# Patient Record
Sex: MALE | Race: OTHER | HISPANIC OR LATINO | Employment: UNEMPLOYED | URBAN - METROPOLITAN AREA
[De-identification: names, ages, dates, MRNs, and addresses within clinical notes are randomized per-mention and may not be internally consistent; named-entity substitution may affect disease eponyms.]

---

## 2022-05-30 ENCOUNTER — HOSPITAL ENCOUNTER (EMERGENCY)
Facility: HOSPITAL | Age: 32
Discharge: HOME | End: 2022-05-30
Attending: EMERGENCY MEDICINE
Payer: MEDICAID

## 2022-05-30 ENCOUNTER — OFFICE VISIT (OUTPATIENT)
Dept: OPHTHALMOLOGY | Facility: CLINIC | Age: 32
End: 2022-05-30
Payer: MEDICAID

## 2022-05-30 VITALS
HEIGHT: 67 IN | WEIGHT: 164.25 LBS | TEMPERATURE: 98 F | DIASTOLIC BLOOD PRESSURE: 83 MMHG | RESPIRATION RATE: 16 BRPM | HEART RATE: 68 BPM | OXYGEN SATURATION: 98 % | SYSTOLIC BLOOD PRESSURE: 136 MMHG | BODY MASS INDEX: 25.78 KG/M2

## 2022-05-30 DIAGNOSIS — H20.9 TRAUMATIC IRITIS: Primary | ICD-10-CM

## 2022-05-30 DIAGNOSIS — H57.04 TRAUMATIC MYDRIASIS: ICD-10-CM

## 2022-05-30 PROCEDURE — 1159F MED LIST DOCD IN RCRD: CPT | Mod: CPTII,,, | Performed by: STUDENT IN AN ORGANIZED HEALTH CARE EDUCATION/TRAINING PROGRAM

## 2022-05-30 PROCEDURE — 99204 OFFICE O/P NEW MOD 45 MIN: CPT | Mod: S$PBB,,, | Performed by: STUDENT IN AN ORGANIZED HEALTH CARE EDUCATION/TRAINING PROGRAM

## 2022-05-30 PROCEDURE — 99212 OFFICE O/P EST SF 10 MIN: CPT | Mod: PBBFAC,PO | Performed by: STUDENT IN AN ORGANIZED HEALTH CARE EDUCATION/TRAINING PROGRAM

## 2022-05-30 PROCEDURE — 25000003 PHARM REV CODE 250: Performed by: NURSE PRACTITIONER

## 2022-05-30 PROCEDURE — 1159F PR MEDICATION LIST DOCUMENTED IN MEDICAL RECORD: ICD-10-PCS | Mod: CPTII,,, | Performed by: STUDENT IN AN ORGANIZED HEALTH CARE EDUCATION/TRAINING PROGRAM

## 2022-05-30 PROCEDURE — 1160F RVW MEDS BY RX/DR IN RCRD: CPT | Mod: CPTII,,, | Performed by: STUDENT IN AN ORGANIZED HEALTH CARE EDUCATION/TRAINING PROGRAM

## 2022-05-30 PROCEDURE — 99283 EMERGENCY DEPT VISIT LOW MDM: CPT | Mod: 27

## 2022-05-30 PROCEDURE — 99204 PR OFFICE/OUTPT VISIT, NEW, LEVL IV, 45-59 MIN: ICD-10-PCS | Mod: S$PBB,,, | Performed by: STUDENT IN AN ORGANIZED HEALTH CARE EDUCATION/TRAINING PROGRAM

## 2022-05-30 PROCEDURE — 99999 PR PBB SHADOW E&M-EST. PATIENT-LVL II: ICD-10-PCS | Mod: PBBFAC,,, | Performed by: STUDENT IN AN ORGANIZED HEALTH CARE EDUCATION/TRAINING PROGRAM

## 2022-05-30 PROCEDURE — 99999 PR PBB SHADOW E&M-EST. PATIENT-LVL II: CPT | Mod: PBBFAC,,, | Performed by: STUDENT IN AN ORGANIZED HEALTH CARE EDUCATION/TRAINING PROGRAM

## 2022-05-30 PROCEDURE — 1160F PR REVIEW ALL MEDS BY PRESCRIBER/CLIN PHARMACIST DOCUMENTED: ICD-10-PCS | Mod: CPTII,,, | Performed by: STUDENT IN AN ORGANIZED HEALTH CARE EDUCATION/TRAINING PROGRAM

## 2022-05-30 RX ORDER — CYCLOPENTOLATE HYDROCHLORIDE 10 MG/ML
1 SOLUTION/ DROPS OPHTHALMIC 2 TIMES DAILY
Qty: 15 ML | Refills: 0 | Status: SHIPPED | OUTPATIENT
Start: 2022-05-30

## 2022-05-30 RX ORDER — PREDNISOLONE ACETATE 10 MG/ML
SUSPENSION/ DROPS OPHTHALMIC
Qty: 5 ML | Refills: 1 | Status: SHIPPED | OUTPATIENT
Start: 2022-05-30 | End: 2022-06-06 | Stop reason: SDUPTHER

## 2022-05-30 RX ADMIN — FLUORESCEIN SODIUM AND BENOXINATE HYDROCHLORIDE 1 DROP: 4; 2.5 SOLUTION OPHTHALMIC at 10:05

## 2022-05-30 NOTE — PROGRESS NOTES
HPI     Pt in today for with from having an injury to the left eye. Pt states he   was nailing something in the wall and a nail popped out and hit him in the   eye. Pt states the light makes his eye  hurt. Pt states there is a mild   pain on the left side of his eye. Pt states his vision is still blurry.    Last edited by Shannan Leger on 5/30/2022  1:25 PM. (History)            Assessment /Plan     For exam results, see Encounter Report.    Traumatic iritis-  - Start Pred Q1hr while awake for 1 day then QID OS after that  - Start Cyclo BID OS    Traumatic mydriasis- Pupil mid dilated today      Return to clinic in 1 week with  as patient requests the [place] location

## 2022-05-30 NOTE — ED PROVIDER NOTES
Encounter Date: 5/30/2022       History     Chief Complaint   Patient presents with    left eye pain     Pt was hit with a nail a week ago. Pt started having problems today. The eye is painful 7/10     32-year-old male with complaint of left eye pain and redness for the past 2 days.  Patient reports that he was actually struck in the left eye with a nail 7 days ago but did not start having pain until 2 days ago.  Patient reports mild blurred vision.  Pain is worse with light exposure.        Review of patient's allergies indicates:  No Known Allergies  History reviewed. No pertinent past medical history.  History reviewed. No pertinent surgical history.  History reviewed. No pertinent family history.     Review of Systems   Constitutional: Negative for fever.   HENT: Negative for sore throat.    Eyes: Positive for pain.   Respiratory: Negative for shortness of breath.    Cardiovascular: Negative for chest pain.   Gastrointestinal: Negative for nausea.   Genitourinary: Negative for dysuria.   Musculoskeletal: Negative for back pain.   Skin: Negative for rash.   Neurological: Negative for weakness.   Hematological: Does not bruise/bleed easily.       Physical Exam     Initial Vitals [05/30/22 0951]   BP Pulse Resp Temp SpO2   136/83 68 16 98 °F (36.7 °C) 98 %      MAP       --         Physical Exam    Nursing note and vitals reviewed.  Constitutional: He appears well-developed and well-nourished.   HENT:   Head: Normocephalic and atraumatic.   Eyes: EOM are normal. Pupils are equal, round, and reactive to light.   Left conjunctival erythema, no hyphema, left pupil dilated, no corneal abrasions   Neck: Neck supple.   Normal range of motion.  Cardiovascular: Normal rate, regular rhythm, normal heart sounds and intact distal pulses.   Pulmonary/Chest: Breath sounds normal.   Abdominal: Abdomen is soft. There is no rebound and no guarding.   Musculoskeletal:         General: Normal range of motion.      Cervical back:  Normal range of motion and neck supple.     Neurological: He is alert.   Skin: Skin is warm and dry.   Psychiatric: He has a normal mood and affect. His behavior is normal. Thought content normal.         ED Course   Procedures  Labs Reviewed - No data to display       Imaging Results    None          Medications   fluorescein-benoxinate 0.25-0.4% ophthalmic solution 1 drop (1 drop Right Eye Given by Other 22 1003)     Medical Decision Makin:09 AM  Case discussed with Dr. English. Dr. English recommended for patient to be evaluated at 1 pm by Dr. Gill at [facility]                       Clinical Impression:   Final diagnoses:  [H20.9] Traumatic iritis (Primary)          ED Disposition Condition    Discharge Stable        ED Prescriptions     None        Follow-up Information     Follow up With Specialties Details Why Contact Info    [facility]  Today  [id number]  [facility] [id number]           Volodymyr Lundberg NP  [phone]

## 2022-06-06 ENCOUNTER — OFFICE VISIT (OUTPATIENT)
Dept: OPHTHALMOLOGY | Facility: CLINIC | Age: 32
End: 2022-06-06
Payer: MEDICAID

## 2022-06-06 DIAGNOSIS — H20.9 TRAUMATIC IRITIS: ICD-10-CM

## 2022-06-06 DIAGNOSIS — H57.04 TRAUMATIC MYDRIASIS: ICD-10-CM

## 2022-06-06 PROCEDURE — 1159F MED LIST DOCD IN RCRD: CPT | Mod: CPTII,,, | Performed by: OPTOMETRIST

## 2022-06-06 PROCEDURE — 99999 PR PBB SHADOW E&M-EST. PATIENT-LVL II: ICD-10-PCS | Mod: PBBFAC,,, | Performed by: OPTOMETRIST

## 2022-06-06 PROCEDURE — 99999 PR PBB SHADOW E&M-EST. PATIENT-LVL II: CPT | Mod: PBBFAC,,, | Performed by: OPTOMETRIST

## 2022-06-06 PROCEDURE — 1159F PR MEDICATION LIST DOCUMENTED IN MEDICAL RECORD: ICD-10-PCS | Mod: CPTII,,, | Performed by: OPTOMETRIST

## 2022-06-06 PROCEDURE — 92004 PR EYE EXAM, NEW PATIENT,COMPREHESV: ICD-10-PCS | Mod: S$PBB,,, | Performed by: OPTOMETRIST

## 2022-06-06 PROCEDURE — 92004 COMPRE OPH EXAM NEW PT 1/>: CPT | Mod: S$PBB,,, | Performed by: OPTOMETRIST

## 2022-06-06 PROCEDURE — 99212 OFFICE O/P EST SF 10 MIN: CPT | Mod: PBBFAC | Performed by: OPTOMETRIST

## 2022-06-06 RX ORDER — PREDNISOLONE ACETATE 10 MG/ML
SUSPENSION/ DROPS OPHTHALMIC
Qty: 5 ML | Refills: 3 | Status: SHIPPED | OUTPATIENT
Start: 2022-06-06 | End: 2022-07-01

## 2022-06-06 NOTE — PROGRESS NOTES
HPI     traumatic iritis      Additional comments: 1 wk f/u per Dr Gill              Comments     Patient rtc today for f/u due to traumatic iritis os - states that he is   using prednisolone OS every 4 hours - was unable to get Cyclo drop - not   as much pain OS now but still very light sensitive. Occas blurry va OU and   gritty sensation           Last edited by Joyce Levy MA on 6/6/2022  2:23 PM. (History)            Assessment /Plan     For exam results, see Encounter Report.    Traumatic iritis  -     prednisoLONE acetate (PRED FORTE) 1 % DrpS; Place 1 drop into the left eye every 6 (six) hours for 5 days, THEN 1 drop every 8 (eight) hours for 5 days. Instill 1 drop to left eye every hour for the 1st day then 4 times daily after that.  Dispense: 5 mL; Refill: 3    Traumatic mydriasis      Much improved iritis  Will start to wean PF  Never filled cyclogel    RTC 10 days recheck

## 2022-06-16 ENCOUNTER — OFFICE VISIT (OUTPATIENT)
Dept: OPHTHALMOLOGY | Facility: CLINIC | Age: 32
End: 2022-06-16
Payer: MEDICAID

## 2022-06-16 DIAGNOSIS — H57.04 TRAUMATIC MYDRIASIS: ICD-10-CM

## 2022-06-16 DIAGNOSIS — H20.9 TRAUMATIC IRITIS: Primary | ICD-10-CM

## 2022-06-16 PROCEDURE — 92012 INTRM OPH EXAM EST PATIENT: CPT | Mod: S$PBB,,, | Performed by: OPTOMETRIST

## 2022-06-16 PROCEDURE — 92012 PR EYE EXAM, EST PATIENT,INTERMED: ICD-10-PCS | Mod: S$PBB,,, | Performed by: OPTOMETRIST

## 2022-06-16 NOTE — PROGRESS NOTES
HPI     10 day Follow up Iritis Check   Traumatic Iritis OS  Prednisolone OS q 4h  Better     Last edited by Dawna Moore MA on 6/16/2022  2:03 PM. (History)            Assessment /Plan     For exam results, see Encounter Report.    Traumatic iritis    Traumatic mydriasis      Much improved, no cell or flare.  Mild mydriasis OS    D/C cyclogel  Wean PF q8h x 5 days then q12 h x 5 days then D/C    RTC if symptoms return.